# Patient Record
Sex: MALE | Race: WHITE | NOT HISPANIC OR LATINO | URBAN - METROPOLITAN AREA
[De-identification: names, ages, dates, MRNs, and addresses within clinical notes are randomized per-mention and may not be internally consistent; named-entity substitution may affect disease eponyms.]

---

## 2018-09-29 ENCOUNTER — EMERGENCY (EMERGENCY)
Facility: HOSPITAL | Age: 28
LOS: 1 days | Discharge: HOME | End: 2018-09-29
Admitting: EMERGENCY MEDICINE
Payer: COMMERCIAL

## 2018-09-29 VITALS
DIASTOLIC BLOOD PRESSURE: 98 MMHG | HEART RATE: 110 BPM | RESPIRATION RATE: 16 BRPM | OXYGEN SATURATION: 98 % | SYSTOLIC BLOOD PRESSURE: 156 MMHG | TEMPERATURE: 98 F | WEIGHT: 220.02 LBS

## 2018-09-29 DIAGNOSIS — I89.8 OTHER SPECIFIED NONINFECTIVE DISORDERS OF LYMPHATIC VESSELS AND LYMPH NODES: ICD-10-CM

## 2018-09-29 DIAGNOSIS — R22.1 LOCALIZED SWELLING, MASS AND LUMP, NECK: ICD-10-CM

## 2018-09-29 PROCEDURE — 99284 EMERGENCY DEPT VISIT MOD MDM: CPT | Mod: 25

## 2018-09-30 VITALS
RESPIRATION RATE: 18 BRPM | SYSTOLIC BLOOD PRESSURE: 143 MMHG | HEART RATE: 65 BPM | TEMPERATURE: 98 F | OXYGEN SATURATION: 100 % | DIASTOLIC BLOOD PRESSURE: 68 MMHG

## 2018-09-30 LAB
AMYLASE P1 CFR SERPL: 102 U/L — SIGNIFICANT CHANGE UP (ref 25–115)
ANION GAP SERPL CALC-SCNC: 8 MMOL/L — LOW (ref 9–16)
BUN SERPL-MCNC: 19 MG/DL — SIGNIFICANT CHANGE UP (ref 7–23)
CALCIUM SERPL-MCNC: 8.3 MG/DL — LOW (ref 8.5–10.5)
CHLORIDE SERPL-SCNC: 105 MMOL/L — SIGNIFICANT CHANGE UP (ref 96–108)
CO2 SERPL-SCNC: 28 MMOL/L — SIGNIFICANT CHANGE UP (ref 22–31)
CREAT SERPL-MCNC: 1.08 MG/DL — SIGNIFICANT CHANGE UP (ref 0.5–1.3)
GLUCOSE SERPL-MCNC: 104 MG/DL — HIGH (ref 70–99)
HCT VFR BLD CALC: 46.9 % — SIGNIFICANT CHANGE UP (ref 39–50)
HGB BLD-MCNC: 15.7 G/DL — SIGNIFICANT CHANGE UP (ref 13–17)
MCHC RBC-ENTMCNC: 30.7 PG — SIGNIFICANT CHANGE UP (ref 27–34)
MCHC RBC-ENTMCNC: 33.5 G/DL — SIGNIFICANT CHANGE UP (ref 32–36)
MCV RBC AUTO: 91.8 FL — SIGNIFICANT CHANGE UP (ref 80–100)
PLATELET # BLD AUTO: 253 K/UL — SIGNIFICANT CHANGE UP (ref 150–400)
POTASSIUM SERPL-MCNC: 3.7 MMOL/L — SIGNIFICANT CHANGE UP (ref 3.5–5.3)
POTASSIUM SERPL-SCNC: 3.7 MMOL/L — SIGNIFICANT CHANGE UP (ref 3.5–5.3)
RBC # BLD: 5.11 M/UL — SIGNIFICANT CHANGE UP (ref 4.2–5.8)
RBC # FLD: 12.1 % — SIGNIFICANT CHANGE UP (ref 10.3–14.5)
SODIUM SERPL-SCNC: 141 MMOL/L — SIGNIFICANT CHANGE UP (ref 132–145)
WBC # BLD: 8.3 K/UL — SIGNIFICANT CHANGE UP (ref 3.8–10.5)
WBC # FLD AUTO: 8.3 K/UL — SIGNIFICANT CHANGE UP (ref 3.8–10.5)

## 2018-09-30 PROCEDURE — 70491 CT SOFT TISSUE NECK W/DYE: CPT | Mod: 26

## 2018-09-30 NOTE — ED PROVIDER NOTE - OBJECTIVE STATEMENT
29 yo M with no known PMHx, presenting c/o L neck/mandibular mass x few weeks.  Pt reports having a progressive enlarging mass in the L mandibular/anterior neck region in the past few wks. Denies fever, chills, FB sensation, change in ROM/sensation, redness, swelling, paresthesia, purulent d/c, change in phonation, dysphagia, odynophagia, cough, congestion, N/V, HA, dizziness, LOC, CP, SOB, and focal weakness.  No weight loss or night sweats.  No family h/o CA noted

## 2018-09-30 NOTE — ED PROVIDER NOTE - MEDICAL DECISION MAKING DETAILS
AFVSS at time of d/c, pt non-toxic appearing, results, ddx, and f/u plans discussed with pt at bedside, d/c'd home to f/u with PMD, strict return precautions discussed, prompt return to ER for any worsening or new sx, pt verbalized understanding. pt with progressive enlargement of palpable mass in the L mandibular region, AFVSS, no s/s of infection on exam, no systemic sx noted, labs unremarkable, CT with cystic structure likely lymphocele, d/c'd home to f/u with ENT for outpt surgical removal if indicated, strict return precautions discussed, prompt return to ER for any worsening or new sx, pt verbalized understanding.

## 2018-09-30 NOTE — ED PROVIDER NOTE - CARE PROVIDERS DIRECT ADDRESSES
[email or web address]

## 2018-09-30 NOTE — ED PROVIDER NOTE - PHYSICAL EXAMINATION
Gen - WDWN, NAD, comfortable and non-toxic appearing  Skin - warm, dry, intact   HEENT - AT/NC, no facial tenderness, no malocclusion, uvula midline, airway patent, neck supple, +palpable mobile mass to the L mandibular/anterior cervical region with slight tenderness, no erythema, ecchymosis, crepitus, fluctuance, or streaking noted, phonating well   CV - S1S2, R/R/R  Resp - CTAB, no r/r/w  GI - soft, ND, NT, no CVAT b/l   MS - w/w/p, no c/c/e  Neuro - AxOx3, ambulatory without gait disturbance

## 2018-09-30 NOTE — ED ADULT NURSE NOTE - CHIEF COMPLAINT QUOTE
ambulatory, here for evaluation of swollen gland to neck area for about a month now. Denies sore throat, fever, chills. Denies difficulty swallowing.

## 2018-09-30 NOTE — ED ADULT NURSE NOTE - NSIMPLEMENTINTERV_GEN_ALL_ED
Implemented All Universal Safety Interventions:  [place] to call system. Call bell, personal items and telephone within reach. Instruct patient to call for assistance. Room bathroom lighting operational. Non-slip footwear when patient is off stretcher. Physically safe environment: no spills, clutter or unnecessary equipment. Stretcher in lowest position, wheels locked, appropriate side rails in place.

## 2018-10-01 LAB
EBV EA AB SER IA-ACNC: 9.9 U/ML — HIGH
EBV EA AB TITR SER IF: POSITIVE
EBV EA IGG SER-ACNC: ABNORMAL
EBV NA IGG SER IA-ACNC: 340 U/ML — HIGH
EBV PATRN SPEC IB-IMP: SIGNIFICANT CHANGE UP
EBV VCA IGG AVIDITY SER QL IA: POSITIVE
EBV VCA IGM SER IA-ACNC: 171 U/ML — HIGH
EBV VCA IGM SER IA-ACNC: <10 U/ML — SIGNIFICANT CHANGE UP
EBV VCA IGM TITR FLD: NEGATIVE — SIGNIFICANT CHANGE UP

## 2018-10-02 PROBLEM — Z00.00 ENCOUNTER FOR PREVENTIVE HEALTH EXAMINATION: Status: ACTIVE | Noted: 2018-10-02

## 2018-10-03 ENCOUNTER — APPOINTMENT (OUTPATIENT)
Dept: OTOLARYNGOLOGY | Facility: CLINIC | Age: 28
End: 2018-10-03
Payer: COMMERCIAL

## 2018-10-03 VITALS
BODY MASS INDEX: 33.33 KG/M2 | HEIGHT: 69 IN | SYSTOLIC BLOOD PRESSURE: 179 MMHG | HEART RATE: 72 BPM | WEIGHT: 225 LBS | DIASTOLIC BLOOD PRESSURE: 97 MMHG | OXYGEN SATURATION: 100 %

## 2018-10-03 DIAGNOSIS — Z78.9 OTHER SPECIFIED HEALTH STATUS: ICD-10-CM

## 2018-10-03 DIAGNOSIS — Z87.891 PERSONAL HISTORY OF NICOTINE DEPENDENCE: ICD-10-CM

## 2018-10-03 PROCEDURE — 99203 OFFICE O/P NEW LOW 30 MIN: CPT

## 2018-10-15 ENCOUNTER — MOBILE ON CALL (OUTPATIENT)
Age: 28
End: 2018-10-15

## 2018-11-15 ENCOUNTER — OUTPATIENT (OUTPATIENT)
Dept: OUTPATIENT SERVICES | Facility: HOSPITAL | Age: 28
LOS: 1 days | End: 2018-11-15
Payer: COMMERCIAL

## 2018-11-15 ENCOUNTER — RESULT REVIEW (OUTPATIENT)
Age: 28
End: 2018-11-15

## 2018-11-15 ENCOUNTER — APPOINTMENT (OUTPATIENT)
Dept: ULTRASOUND IMAGING | Facility: CLINIC | Age: 28
End: 2018-11-15
Payer: COMMERCIAL

## 2018-11-15 PROCEDURE — 10022: CPT

## 2018-11-15 PROCEDURE — 76942 ECHO GUIDE FOR BIOPSY: CPT

## 2018-11-15 PROCEDURE — 76942 ECHO GUIDE FOR BIOPSY: CPT | Mod: 26

## 2018-11-15 PROCEDURE — 88173 CYTOPATH EVAL FNA REPORT: CPT

## 2018-11-15 PROCEDURE — 88305 TISSUE EXAM BY PATHOLOGIST: CPT

## 2018-11-19 LAB — NON-GYNECOLOGICAL CYTOLOGY STUDY: SIGNIFICANT CHANGE UP

## 2019-04-24 ENCOUNTER — APPOINTMENT (OUTPATIENT)
Dept: OTOLARYNGOLOGY | Facility: CLINIC | Age: 29
End: 2019-04-24
Payer: COMMERCIAL

## 2019-04-24 VITALS
TEMPERATURE: 97 F | RESPIRATION RATE: 16 BRPM | DIASTOLIC BLOOD PRESSURE: 102 MMHG | BODY MASS INDEX: 32.58 KG/M2 | HEART RATE: 66 BPM | WEIGHT: 220 LBS | OXYGEN SATURATION: 97 % | HEIGHT: 69 IN | SYSTOLIC BLOOD PRESSURE: 160 MMHG

## 2019-04-24 PROCEDURE — 99213 OFFICE O/P EST LOW 20 MIN: CPT

## 2019-04-25 NOTE — REASON FOR VISIT
[Subsequent Evaluation] : a subsequent evaluation for [FreeTextEntry2] : left submandibular space cyst

## 2019-04-25 NOTE — REVIEW OF SYSTEMS
[As Noted in HPI] : as noted in HPI [Swelling Neck] : swelling neck [Negative] : Psychiatric [Fever] : no fever [Swelling Face] : no face swelling [Feeling Poorly] : not feeling poorly [Chills] : no chills [Feeling Tired] : not feeling tired [Chest Pain] : no chest pain [Cough] : no cough [Palpitations] : no palpitations [Muscle Weakness] : no muscle weakness

## 2019-04-25 NOTE — PHYSICAL EXAM
[Normal] : no rashes [de-identified] : there is ballotable, soft, mobile mass of the left submandibular space that can also be palpated by bimanual palpation of the left FOM though there is no intraoral component or swelling and FOM is otherwise normal [de-identified] : no blue hue of FOM [de-identified] : no enlarged or palpable lymph nodes, only palpable submand space lesion [de-identified] : n

## 2019-04-25 NOTE — HISTORY OF PRESENT ILLNESS
[de-identified] : 28 yo M noticed a left neck mass last fall which does wax and wane in size. He went to urgent care at [facility] on 9/30/18 to evaluate the mass. CT neck with contrast showed a 3.2 x 1.9 x 3.2 cm cystic structure in the left submandibular space without perceptible wall or surrounding inflammation. Differentials included lymphocele, lymphangioma and epidermoid cyst.  He had FNA which yielded clear/yellow fluid, no cells and this was also therapeutic as it decompressed the lesion so the decision was made to observe the lesion and see if it reformed/recollected [FreeTextEntry1] : Over the last few months, fluid has recollected in the left submandibular space, which is non-painful, does wax and wane a little bit in size but not with any temporal relation and is not related to eating.  He has no weight loss, no odynophgia, no globus, no weight loss, appettite is normal

## 2019-05-21 ENCOUNTER — FORM ENCOUNTER (OUTPATIENT)
Age: 29
End: 2019-05-21

## 2019-05-22 ENCOUNTER — OUTPATIENT (OUTPATIENT)
Dept: OUTPATIENT SERVICES | Facility: HOSPITAL | Age: 29
LOS: 1 days | End: 2019-05-22
Payer: COMMERCIAL

## 2019-05-22 ENCOUNTER — APPOINTMENT (OUTPATIENT)
Dept: ULTRASOUND IMAGING | Facility: HOSPITAL | Age: 29
End: 2019-05-22
Payer: COMMERCIAL

## 2019-05-22 ENCOUNTER — RESULT REVIEW (OUTPATIENT)
Age: 29
End: 2019-05-22

## 2019-05-22 LAB
GRAM STN FLD: SIGNIFICANT CHANGE UP
SPECIMEN SOURCE: SIGNIFICANT CHANGE UP

## 2019-05-22 PROCEDURE — 88173 CYTOPATH EVAL FNA REPORT: CPT

## 2019-05-22 PROCEDURE — 87205 SMEAR GRAM STAIN: CPT

## 2019-05-22 PROCEDURE — 87070 CULTURE OTHR SPECIMN AEROBIC: CPT

## 2019-05-22 PROCEDURE — 87075 CULTR BACTERIA EXCEPT BLOOD: CPT

## 2019-05-22 PROCEDURE — 10005 FNA BX W/US GDN 1ST LES: CPT

## 2019-05-22 PROCEDURE — 88305 TISSUE EXAM BY PATHOLOGIST: CPT

## 2019-05-23 LAB — NON-GYNECOLOGICAL CYTOLOGY STUDY: SIGNIFICANT CHANGE UP

## 2019-05-26 LAB
CULTURE RESULTS: NO GROWTH — SIGNIFICANT CHANGE UP
SPECIMEN SOURCE: SIGNIFICANT CHANGE UP

## 2019-06-05 ENCOUNTER — APPOINTMENT (OUTPATIENT)
Dept: OTOLARYNGOLOGY | Facility: CLINIC | Age: 29
End: 2019-06-05
Payer: COMMERCIAL

## 2019-06-05 VITALS
HEIGHT: 69 IN | BODY MASS INDEX: 32.88 KG/M2 | SYSTOLIC BLOOD PRESSURE: 150 MMHG | HEART RATE: 69 BPM | DIASTOLIC BLOOD PRESSURE: 98 MMHG | OXYGEN SATURATION: 98 % | WEIGHT: 222 LBS

## 2019-06-05 DIAGNOSIS — I89.8 OTHER SPECIFIED NONINFECTIVE DISORDERS OF LYMPHATIC VESSELS AND LYMPH NODES: ICD-10-CM

## 2019-06-05 DIAGNOSIS — K11.8 OTHER DISEASES OF SALIVARY GLANDS: ICD-10-CM

## 2019-06-05 PROCEDURE — 99213 OFFICE O/P EST LOW 20 MIN: CPT

## 2019-06-07 PROBLEM — K11.8 SUBMANDIBULAR GLAND MASS: Status: ACTIVE | Noted: 2018-10-03

## 2019-06-07 PROBLEM — I89.8 LYMPHOCELE: Status: ACTIVE | Noted: 2019-06-07

## 2019-06-07 NOTE — HISTORY OF PRESENT ILLNESS
[de-identified] : 30 yo M noticed a left neck mass last fall which waxes and wanes in size. He went to urgent care at [facility] on 9/30/18 to evaluate the mass. CT neck with contrast showed a 3.2 x 1.9 x 3.2 cm cystic structure in the left submandibular space without perceptible wall or surrounding inflammation. Differentials included lymphocele, lymphangioma and epidermoid cyst. He had FNA which yielded clear/yellow fluid, no cells and this was also therapeutic as it decompressed the lesion so the decision was made to observe the lesion and see if it reformed/recollected \par Interval History: Over the last few months, fluid has recollected in the left submandibular space, which is non-painful, does wax and wane a little bit in size but not with any temporal relation and is not related to eating. He has no weight loss, no odynophagia, no globus, no weight loss, appetite is normal.  He had a repeat FNA on 5/22/19 for decompression purposes and he reports that the procedure was painful, 30 cc was aspirated which recollected the next morning.  FNA cytology - lymphocele.\par

## 2019-06-07 NOTE — PHYSICAL EXAM
[Normal] : orientation to person, place, and time: normal [de-identified] : there is soft, mobile mass encompassing the entire left submandibular space that can also be palpated on bimanual palpation of the left FOM though there is no intraoral component or swelling and FOM is otherwise normal

## 2019-06-07 NOTE — ASSESSMENT
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

## 2019-06-11 ENCOUNTER — FORM ENCOUNTER (OUTPATIENT)
Age: 29
End: 2019-06-11

## 2019-06-11 ENCOUNTER — RX RENEWAL (OUTPATIENT)
Age: 29
End: 2019-06-11

## 2019-06-11 DIAGNOSIS — F41.8 OTHER SPECIFIED ANXIETY DISORDERS: ICD-10-CM

## 2019-06-11 RX ORDER — DIAZEPAM 5 MG/1
5 TABLET ORAL
Qty: 2 | Refills: 0 | Status: ACTIVE | COMMUNITY
Start: 2019-06-11 | End: 1900-01-01

## 2019-06-12 ENCOUNTER — APPOINTMENT (OUTPATIENT)
Dept: MRI IMAGING | Facility: CLINIC | Age: 29
End: 2019-06-12
Payer: COMMERCIAL

## 2019-06-12 ENCOUNTER — OUTPATIENT (OUTPATIENT)
Dept: OUTPATIENT SERVICES | Facility: HOSPITAL | Age: 29
LOS: 1 days | End: 2019-06-12

## 2019-06-12 PROCEDURE — 70543 MRI ORBT/FAC/NCK W/O &W/DYE: CPT | Mod: 26

## 2019-06-25 ENCOUNTER — FORM ENCOUNTER (OUTPATIENT)
Age: 29
End: 2019-06-25

## 2019-06-26 ENCOUNTER — OUTPATIENT (OUTPATIENT)
Dept: OUTPATIENT SERVICES | Facility: HOSPITAL | Age: 29
LOS: 1 days | End: 2019-06-26
Payer: COMMERCIAL

## 2019-06-26 ENCOUNTER — RESULT REVIEW (OUTPATIENT)
Age: 29
End: 2019-06-26

## 2019-06-26 ENCOUNTER — APPOINTMENT (OUTPATIENT)
Dept: ULTRASOUND IMAGING | Facility: HOSPITAL | Age: 29
End: 2019-06-26
Payer: COMMERCIAL

## 2019-06-26 PROCEDURE — 88173 CYTOPATH EVAL FNA REPORT: CPT

## 2019-06-26 PROCEDURE — 10005 FNA BX W/US GDN 1ST LES: CPT

## 2019-06-26 PROCEDURE — 88305 TISSUE EXAM BY PATHOLOGIST: CPT

## 2019-06-28 LAB — NON-GYNECOLOGICAL CYTOLOGY STUDY: SIGNIFICANT CHANGE UP

## 2024-11-04 NOTE — ED ADULT TRIAGE NOTE - CHIEF COMPLAINT QUOTE
ambulatory, here for evaluation of swollen gland to neck area for about a month now. Denies sore throat, fever, chills. Denies difficulty swallowing. Admission